# Patient Record
Sex: MALE | Race: BLACK OR AFRICAN AMERICAN
[De-identification: names, ages, dates, MRNs, and addresses within clinical notes are randomized per-mention and may not be internally consistent; named-entity substitution may affect disease eponyms.]

---

## 2019-12-30 ENCOUNTER — HOSPITAL ENCOUNTER (EMERGENCY)
Dept: HOSPITAL 8 - ED | Age: 84
Discharge: HOME | End: 2019-12-30
Payer: SELF-PAY

## 2019-12-30 VITALS — SYSTOLIC BLOOD PRESSURE: 178 MMHG | DIASTOLIC BLOOD PRESSURE: 88 MMHG

## 2019-12-30 VITALS — WEIGHT: 174.17 LBS | BODY MASS INDEX: 27.34 KG/M2 | HEIGHT: 67 IN

## 2019-12-30 DIAGNOSIS — I10: ICD-10-CM

## 2019-12-30 DIAGNOSIS — J15.9: Primary | ICD-10-CM

## 2019-12-30 DIAGNOSIS — D72.829: ICD-10-CM

## 2019-12-30 DIAGNOSIS — J98.4: ICD-10-CM

## 2019-12-30 DIAGNOSIS — Z87.891: ICD-10-CM

## 2019-12-30 DIAGNOSIS — R04.2: ICD-10-CM

## 2019-12-30 DIAGNOSIS — N18.9: ICD-10-CM

## 2019-12-30 LAB
<PLATELET ESTIMATE>: (no result)
ANION GAP SERPL CALC-SCNC: 8 MMOL/L (ref 5–15)
ANISOCYTOSIS BLD QL SMEAR: (no result)
APTT BLD: 43 SECONDS (ref 25–31)
BAND#(MANUAL): 1.65 X10^3/UL
BASOPHILS NFR BLD MANUAL: 2 % (ref 0–1)
BASOS#(MANUAL): 0.66 X10^3/UL (ref 0–0.1)
CALCIUM SERPL-MCNC: 8.6 MG/DL (ref 8.5–10.1)
CHLORIDE SERPL-SCNC: 106 MMOL/L (ref 98–107)
CREAT SERPL-MCNC: 2.09 MG/DL (ref 0.7–1.3)
ERYTHROCYTE [DISTWIDTH] IN BLOOD BY AUTOMATED COUNT: 28.5 % (ref 9.4–14.8)
GIANT PLATELETS BLD QL SMEAR: (no result)
HYPOCHROMIA BLD QL SMEAR: (no result)
INR PPP: 1.22 (ref 0.93–1.1)
LG PLATELETS BLD QL SMEAR: (no result)
LYMPH#(MANUAL): 4.94 X10^3/UL (ref 1–3.4)
LYMPHS% (MANUAL): 15 % (ref 22–44)
MCH RBC QN AUTO: 16.1 PG (ref 27.5–34.5)
MCHC RBC AUTO-ENTMCNC: 28.3 G/DL (ref 33.2–36.2)
MCV RBC AUTO: 57 FL (ref 81–97)
MD: YES
METAMYELOCYTES# (MANUAL): 1.65 X10^3/UL (ref 0–0)
METAMYELOCYTES% (MANUAL): 5 % (ref 0–1)
MICROCYTES BLD QL SMEAR: (no result)
MONOS#(MANUAL): 5.92 X10^3/UL (ref 0.3–2.7)
MONOS% (MANUAL): 18 % (ref 2–9)
MYELOCYTES# (MANUAL): 0.33 X10^3/UL (ref 0–0)
MYELOCYTES% (MANUAL): 1 % (ref 0–0)
NEUTS BAND NFR BLD: 5 % (ref 0–7)
NRBC % (MANUAL): 1 % (ref 0–1)
PLATELET # BLD AUTO: 426 X10^3/UL (ref 130–400)
PMV BLD AUTO: 12.1 FL (ref 7.4–10.4)
POLYCHROMASIA BLD QL SMEAR: (no result)
PROTHROMBIN TIME: 12.7 SECONDS (ref 9.6–11.5)
RBC # BLD AUTO: 7.97 X10^6/UL (ref 4.38–5.82)
SEG#(MANUAL): 17.77 X10^3/UL (ref 1.8–6.8)
SEGS% (MANUAL): 54 % (ref 42–75)
TARGETS BLD QL SMEAR: (no result)

## 2019-12-30 PROCEDURE — 85025 COMPLETE CBC W/AUTO DIFF WBC: CPT

## 2019-12-30 PROCEDURE — 85610 PROTHROMBIN TIME: CPT

## 2019-12-30 PROCEDURE — 71045 X-RAY EXAM CHEST 1 VIEW: CPT

## 2019-12-30 PROCEDURE — 99284 EMERGENCY DEPT VISIT MOD MDM: CPT

## 2019-12-30 PROCEDURE — 80048 BASIC METABOLIC PNL TOTAL CA: CPT

## 2019-12-30 PROCEDURE — 71250 CT THORAX DX C-: CPT

## 2019-12-30 PROCEDURE — 36415 COLL VENOUS BLD VENIPUNCTURE: CPT

## 2019-12-30 PROCEDURE — 85730 THROMBOPLASTIN TIME PARTIAL: CPT

## 2019-12-30 NOTE — NUR
no coughing/bleeding noted. awaiting md, call bell in reach, fam at bedside. 
denies cp/sob/dizziness. AS